# Patient Record
Sex: MALE | Race: WHITE | NOT HISPANIC OR LATINO | Employment: FULL TIME | URBAN - METROPOLITAN AREA
[De-identification: names, ages, dates, MRNs, and addresses within clinical notes are randomized per-mention and may not be internally consistent; named-entity substitution may affect disease eponyms.]

---

## 2023-02-20 PROBLEM — S22.39XA RIB FRACTURE: Status: ACTIVE | Noted: 2023-02-20

## 2023-02-20 PROBLEM — H53.2 DOUBLE VISION: Status: ACTIVE | Noted: 2023-02-20

## 2023-02-20 PROBLEM — R39.15 URINARY URGENCY: Status: ACTIVE | Noted: 2023-02-20

## 2023-02-20 PROBLEM — R68.89 FLU-LIKE SYMPTOMS: Status: ACTIVE | Noted: 2023-02-20

## 2023-02-20 PROBLEM — R05.9 COUGH: Status: ACTIVE | Noted: 2023-02-20

## 2023-02-20 PROBLEM — S29.8XXA BLUNT INJURY TO CHEST: Status: ACTIVE | Noted: 2023-02-20

## 2023-02-20 PROBLEM — G47.00 INSOMNIA: Status: ACTIVE | Noted: 2023-02-20

## 2023-02-20 PROBLEM — F41.8 ANXIETY ASSOCIATED WITH DEPRESSION: Status: ACTIVE | Noted: 2023-02-20

## 2023-02-20 PROBLEM — R19.7 VOMITING AND DIARRHEA: Status: ACTIVE | Noted: 2023-02-20

## 2023-02-20 PROBLEM — R22.1 NECK MASS: Status: ACTIVE | Noted: 2023-02-20
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
## 2023-02-20 PROBLEM — R10.32 LEFT INGUINAL PAIN: Status: ACTIVE | Noted: 2023-02-20

## 2023-02-20 PROBLEM — S22.42XS MULTIPLE FRACTURES OF RIBS, LEFT SIDE, SEQUELA: Status: ACTIVE | Noted: 2023-02-20

## 2023-02-20 PROBLEM — F32.A DEPRESSION: Status: ACTIVE | Noted: 2023-02-20

## 2023-02-20 PROBLEM — S06.0XAA CONCUSSION: Status: ACTIVE | Noted: 2023-02-20

## 2023-02-20 PROBLEM — R11.10 VOMITING AND DIARRHEA: Status: ACTIVE | Noted: 2023-02-20

## 2023-02-20 PROBLEM — K21.9 GERD (GASTROESOPHAGEAL REFLUX DISEASE): Status: ACTIVE | Noted: 2023-02-20

## 2023-02-20 PROBLEM — K46.9 HERNIA OF ABDOMINAL CAVITY: Status: ACTIVE | Noted: 2023-02-20

## 2023-02-20 PROBLEM — L03.90 CELLULITIS: Status: ACTIVE | Noted: 2023-02-20

## 2023-02-20 PROBLEM — S09.8XXA BLUNT HEAD TRAUMA: Status: ACTIVE | Noted: 2023-02-20

## 2023-02-20 PROBLEM — H49.22 SIXTH NERVE PALSY OF LEFT EYE: Status: ACTIVE | Noted: 2023-02-20

## 2023-02-20 PROBLEM — S20.312A ABRASION OF LEFT CHEST WALL: Status: ACTIVE | Noted: 2023-02-20

## 2023-02-20 PROBLEM — R50.9 FEVER AND CHILLS: Status: ACTIVE | Noted: 2023-02-20

## 2023-02-20 RX ORDER — OMEPRAZOLE 40 MG/1
1 CAPSULE, DELAYED RELEASE ORAL DAILY
COMMUNITY
Start: 2019-12-05

## 2023-02-20 RX ORDER — ESCITALOPRAM OXALATE 20 MG/1
1 TABLET ORAL DAILY
COMMUNITY
Start: 2019-11-11 | End: 2023-12-12 | Stop reason: ALTCHOICE

## 2023-03-20 ENCOUNTER — APPOINTMENT (OUTPATIENT)
Dept: PRIMARY CARE | Facility: CLINIC | Age: 33
End: 2023-03-20
Payer: MEDICAID

## 2023-11-23 ENCOUNTER — APPOINTMENT (OUTPATIENT)
Dept: RADIOLOGY | Facility: HOSPITAL | Age: 33
End: 2023-11-23
Payer: MEDICAID

## 2023-11-23 ENCOUNTER — HOSPITAL ENCOUNTER (EMERGENCY)
Facility: HOSPITAL | Age: 33
Discharge: HOME | End: 2023-11-23
Payer: MEDICAID

## 2023-11-23 VITALS
OXYGEN SATURATION: 99 % | WEIGHT: 205 LBS | BODY MASS INDEX: 27.77 KG/M2 | HEART RATE: 77 BPM | DIASTOLIC BLOOD PRESSURE: 83 MMHG | RESPIRATION RATE: 18 BRPM | SYSTOLIC BLOOD PRESSURE: 142 MMHG | HEIGHT: 72 IN | TEMPERATURE: 97.3 F

## 2023-11-23 DIAGNOSIS — R10.13 ABDOMINAL PAIN, EPIGASTRIC: Primary | ICD-10-CM

## 2023-11-23 LAB
ALBUMIN SERPL BCP-MCNC: 4.4 G/DL (ref 3.4–5)
ALP SERPL-CCNC: 80 U/L (ref 33–120)
ALT SERPL W P-5'-P-CCNC: 30 U/L (ref 10–52)
ANION GAP SERPL CALC-SCNC: 14 MMOL/L (ref 10–20)
AST SERPL W P-5'-P-CCNC: 25 U/L (ref 9–39)
BASOPHILS # BLD AUTO: 0.05 X10*3/UL (ref 0–0.1)
BASOPHILS NFR BLD AUTO: 0.6 %
BILIRUB DIRECT SERPL-MCNC: 0.2 MG/DL (ref 0–0.3)
BILIRUB SERPL-MCNC: 0.9 MG/DL (ref 0–1.2)
BUN SERPL-MCNC: 6 MG/DL (ref 6–23)
CALCIUM SERPL-MCNC: 9.1 MG/DL (ref 8.6–10.3)
CARDIAC TROPONIN I PNL SERPL HS: <3 NG/L (ref 0–20)
CHLORIDE SERPL-SCNC: 104 MMOL/L (ref 98–107)
CO2 SERPL-SCNC: 25 MMOL/L (ref 21–32)
CREAT SERPL-MCNC: 0.96 MG/DL (ref 0.5–1.3)
EOSINOPHIL # BLD AUTO: 0.11 X10*3/UL (ref 0–0.7)
EOSINOPHIL NFR BLD AUTO: 1.4 %
ERYTHROCYTE [DISTWIDTH] IN BLOOD BY AUTOMATED COUNT: 14.5 % (ref 11.5–14.5)
GFR SERPL CREATININE-BSD FRML MDRD: >90 ML/MIN/1.73M*2
GLUCOSE SERPL-MCNC: 90 MG/DL (ref 74–99)
HCT VFR BLD AUTO: 50 % (ref 41–52)
HGB BLD-MCNC: 17.1 G/DL (ref 13.5–17.5)
IMM GRANULOCYTES # BLD AUTO: 0.03 X10*3/UL (ref 0–0.7)
IMM GRANULOCYTES NFR BLD AUTO: 0.4 % (ref 0–0.9)
LACTATE SERPL-SCNC: 1.7 MMOL/L (ref 0.4–2)
LIPASE SERPL-CCNC: 17 U/L (ref 9–82)
LYMPHOCYTES # BLD AUTO: 2.29 X10*3/UL (ref 1.2–4.8)
LYMPHOCYTES NFR BLD AUTO: 28.1 %
MCH RBC QN AUTO: 33.3 PG (ref 26–34)
MCHC RBC AUTO-ENTMCNC: 34.2 G/DL (ref 32–36)
MCV RBC AUTO: 97 FL (ref 80–100)
MONOCYTES # BLD AUTO: 0.59 X10*3/UL (ref 0.1–1)
MONOCYTES NFR BLD AUTO: 7.2 %
NEUTROPHILS # BLD AUTO: 5.07 X10*3/UL (ref 1.2–7.7)
NEUTROPHILS NFR BLD AUTO: 62.3 %
NRBC BLD-RTO: 0 /100 WBCS (ref 0–0)
PLATELET # BLD AUTO: 272 X10*3/UL (ref 150–450)
POTASSIUM SERPL-SCNC: 4.2 MMOL/L (ref 3.5–5.3)
PROT SERPL-MCNC: 7.3 G/DL (ref 6.4–8.2)
RBC # BLD AUTO: 5.14 X10*6/UL (ref 4.5–5.9)
SODIUM SERPL-SCNC: 139 MMOL/L (ref 136–145)
WBC # BLD AUTO: 8.1 X10*3/UL (ref 4.4–11.3)

## 2023-11-23 PROCEDURE — 76705 ECHO EXAM OF ABDOMEN: CPT

## 2023-11-23 PROCEDURE — 83605 ASSAY OF LACTIC ACID: CPT | Performed by: NURSE PRACTITIONER

## 2023-11-23 PROCEDURE — 84484 ASSAY OF TROPONIN QUANT: CPT | Performed by: NURSE PRACTITIONER

## 2023-11-23 PROCEDURE — C9113 INJ PANTOPRAZOLE SODIUM, VIA: HCPCS | Performed by: NURSE PRACTITIONER

## 2023-11-23 PROCEDURE — 74177 CT ABD & PELVIS W/CONTRAST: CPT

## 2023-11-23 PROCEDURE — 2500000004 HC RX 250 GENERAL PHARMACY W/ HCPCS (ALT 636 FOR OP/ED): Performed by: NURSE PRACTITIONER

## 2023-11-23 PROCEDURE — 2550000001 HC RX 255 CONTRASTS: Performed by: NURSE PRACTITIONER

## 2023-11-23 PROCEDURE — 82248 BILIRUBIN DIRECT: CPT | Performed by: NURSE PRACTITIONER

## 2023-11-23 PROCEDURE — 83690 ASSAY OF LIPASE: CPT | Performed by: NURSE PRACTITIONER

## 2023-11-23 PROCEDURE — 99285 EMERGENCY DEPT VISIT HI MDM: CPT | Mod: 25

## 2023-11-23 PROCEDURE — 85025 COMPLETE CBC W/AUTO DIFF WBC: CPT | Performed by: NURSE PRACTITIONER

## 2023-11-23 PROCEDURE — 96372 THER/PROPH/DIAG INJ SC/IM: CPT | Mod: 59

## 2023-11-23 PROCEDURE — 74177 CT ABD & PELVIS W/CONTRAST: CPT | Performed by: RADIOLOGY

## 2023-11-23 PROCEDURE — 96374 THER/PROPH/DIAG INJ IV PUSH: CPT | Mod: 59

## 2023-11-23 PROCEDURE — 80053 COMPREHEN METABOLIC PANEL: CPT | Performed by: NURSE PRACTITIONER

## 2023-11-23 PROCEDURE — 76705 ECHO EXAM OF ABDOMEN: CPT | Performed by: STUDENT IN AN ORGANIZED HEALTH CARE EDUCATION/TRAINING PROGRAM

## 2023-11-23 PROCEDURE — 36415 COLL VENOUS BLD VENIPUNCTURE: CPT | Performed by: NURSE PRACTITIONER

## 2023-11-23 PROCEDURE — 2500000001 HC RX 250 WO HCPCS SELF ADMINISTERED DRUGS (ALT 637 FOR MEDICARE OP): Performed by: NURSE PRACTITIONER

## 2023-11-23 PROCEDURE — 96375 TX/PRO/DX INJ NEW DRUG ADDON: CPT

## 2023-11-23 RX ORDER — KETOROLAC TROMETHAMINE 30 MG/ML
15 INJECTION, SOLUTION INTRAMUSCULAR; INTRAVENOUS ONCE
Status: COMPLETED | OUTPATIENT
Start: 2023-11-23 | End: 2023-11-23

## 2023-11-23 RX ORDER — FAMOTIDINE 20 MG/1
20 TABLET, FILM COATED ORAL 2 TIMES DAILY
Qty: 30 TABLET | Refills: 0 | Status: SHIPPED | OUTPATIENT
Start: 2023-11-23 | End: 2024-02-27

## 2023-11-23 RX ORDER — DICYCLOMINE HYDROCHLORIDE 10 MG/ML
20 INJECTION INTRAMUSCULAR ONCE
Status: COMPLETED | OUTPATIENT
Start: 2023-11-23 | End: 2023-11-23

## 2023-11-23 RX ORDER — ALUMINUM HYDROXIDE, MAGNESIUM HYDROXIDE, AND SIMETHICONE 1200; 120; 1200 MG/30ML; MG/30ML; MG/30ML
30 SUSPENSION ORAL ONCE
Status: COMPLETED | OUTPATIENT
Start: 2023-11-23 | End: 2023-11-23

## 2023-11-23 RX ORDER — LIDOCAINE HYDROCHLORIDE 20 MG/ML
15 SOLUTION OROPHARYNGEAL ONCE
Status: COMPLETED | OUTPATIENT
Start: 2023-11-23 | End: 2023-11-23

## 2023-11-23 RX ORDER — PANTOPRAZOLE SODIUM 40 MG/10ML
40 INJECTION, POWDER, LYOPHILIZED, FOR SOLUTION INTRAVENOUS ONCE
Status: COMPLETED | OUTPATIENT
Start: 2023-11-23 | End: 2023-11-23

## 2023-11-23 RX ADMIN — LIDOCAINE HYDROCHLORIDE 15 ML: 20 SOLUTION ORAL at 15:23

## 2023-11-23 RX ADMIN — ALUMINUM HYDROXIDE, MAGNESIUM HYDROXIDE, AND DIMETHICONE 30 ML: 200; 20; 200 SUSPENSION ORAL at 15:23

## 2023-11-23 RX ADMIN — PANTOPRAZOLE SODIUM 40 MG: 40 INJECTION, POWDER, FOR SOLUTION INTRAVENOUS at 15:23

## 2023-11-23 RX ADMIN — IOHEXOL 75 ML: 350 INJECTION, SOLUTION INTRAVENOUS at 16:35

## 2023-11-23 RX ADMIN — KETOROLAC TROMETHAMINE 15 MG: 30 INJECTION, SOLUTION INTRAMUSCULAR; INTRAVENOUS at 16:20

## 2023-11-23 RX ADMIN — DICYCLOMINE HYDROCHLORIDE 20 MG: 10 INJECTION, SOLUTION INTRAMUSCULAR at 17:13

## 2023-11-23 ASSESSMENT — PAIN SCALES - GENERAL
PAINLEVEL_OUTOF10: 8
PAINLEVEL_OUTOF10: 5 - MODERATE PAIN
PAINLEVEL_OUTOF10: 10 - WORST POSSIBLE PAIN

## 2023-11-23 ASSESSMENT — LIFESTYLE VARIABLES
EVER FELT BAD OR GUILTY ABOUT YOUR DRINKING: NO
HAVE PEOPLE ANNOYED YOU BY CRITICIZING YOUR DRINKING: NO
EVER HAD A DRINK FIRST THING IN THE MORNING TO STEADY YOUR NERVES TO GET RID OF A HANGOVER: NO
REASON UNABLE TO ASSESS: NO
HAVE YOU EVER FELT YOU SHOULD CUT DOWN ON YOUR DRINKING: NO

## 2023-11-23 ASSESSMENT — PAIN - FUNCTIONAL ASSESSMENT: PAIN_FUNCTIONAL_ASSESSMENT: 0-10

## 2023-11-23 ASSESSMENT — PAIN DESCRIPTION - LOCATION: LOCATION: ABDOMEN

## 2023-11-23 NOTE — ED PROVIDER NOTES
HPI   Chief Complaint   Patient presents with    Abdominal Pain       33-year-old male with no significant past medical history presents to the emergency department today complaining of abdominal pain.  Patient states that he has been having upper abdominal discomfort for the past 3 days.  Is been consistent in nature.  Is keeping him up at night due to the pain.  He does have a decreased appetite and eating does worsen his discomfort.  He does have some nausea and does vomit to the point where it is painful.  No fever or chills.  States that he did have this a few months ago and was supposed to follow-up with a gastroenterologist but was unable to do so.  He did take some ibuprofen at home as well as omeprazole in the past 2 days.  Denies any urinary symptoms, fever, chills, chest pain or shortness of breath.  No dizziness, headache or syncope.                          E[place] Coma Scale Score: 15                  Patient History   Past Medical History:   Diagnosis Date    Calculus of kidney 07/20/2021    Kidney stone on right side    Personal history of other diseases of the musculoskeletal system and connective tissue     History of lateral epicondylitis of right elbow     Past Surgical History:   Procedure Laterality Date    OTHER SURGICAL HISTORY  11/11/2019    Dental surgery     Family History   Problem Relation Name Age of Onset    Anxiety disorder Mother      Hypertension Mother      Coronary artery disease Father       Social History     Tobacco Use    Smoking status: Not on file    Smokeless tobacco: Not on file   Substance Use Topics    Alcohol use: Not on file    Drug use: Not on file       Physical Exam   ED Triage Vitals [11/23/23 1445]   Temp Heart Rate Resp BP   36.3 °C (97.3 °F) 93 18 --      SpO2 Temp Source Heart Rate Source Patient Position   99 % Temporal Monitor Sitting      BP Location FiO2 (%)     Left arm --       Physical Exam  Vitals and nursing note reviewed.   Constitutional:        General: He is not in acute distress.     Appearance: Normal appearance. He is not toxic-appearing.   HENT:      Right Ear: Tympanic membrane normal.      Left Ear: Tympanic membrane normal.      Mouth/Throat:      Mouth: Mucous membranes are moist.      Pharynx: Oropharynx is clear.   Eyes:      Extraocular Movements: Extraocular movements intact.      Pupils: Pupils are equal, round, and reactive to light.   Cardiovascular:      Rate and Rhythm: Normal rate and regular rhythm.      Pulses: Normal pulses.      Heart sounds: Normal heart sounds.   Pulmonary:      Effort: Pulmonary effort is normal.      Breath sounds: Normal breath sounds.   Abdominal:      General: Abdomen is flat. Bowel sounds are normal.      Palpations: Abdomen is soft.      Tenderness: There is abdominal tenderness in the right upper quadrant, epigastric area and left upper quadrant. There is no right CVA tenderness, left CVA tenderness, guarding or rebound. Negative signs include Woo's sign.   Musculoskeletal:         General: Normal range of motion.      Cervical back: Normal range of motion and neck supple.   Skin:     General: Skin is warm and dry.      Capillary Refill: Capillary refill takes less than 2 seconds.   Neurological:      General: No focal deficit present.      Mental Status: He is alert and oriented to person, place, and time.   Psychiatric:         Mood and Affect: Mood normal.         Behavior: Behavior normal.         Judgment: Judgment normal.         Labs Reviewed   BASIC METABOLIC PANEL - Normal       Result Value    Glucose 90      Sodium 139      Potassium 4.2      Chloride 104      Bicarbonate 25      Anion Gap 14      Urea Nitrogen 6      Creatinine 0.96      eGFR >90      Calcium 9.1     HEPATIC FUNCTION PANEL - Normal    Albumin 4.4      Bilirubin, Total 0.9      Bilirubin, Direct 0.2      Alkaline Phosphatase 80      ALT 30      AST 25      Total Protein 7.3     LIPASE - Normal    Lipase 17      Narrative:      Venipuncture immediately after or during the administration of Metamizole may lead to falsely low results. Testing should be performed immediately prior to Metamizole dosing.   LACTATE - Normal    Lactate 1.7      Narrative:     Venipuncture immediately after or during the administration of Metamizole may lead to falsely low results. Testing should be performed immediately  prior to Metamizole dosing.   TROPONIN I, HIGH SENSITIVITY - Normal    Troponin I, High Sensitivity <3      Narrative:     Less than 99th percentile of normal range cutoff-  Female and children under 18 years old <14 ng/L; Male <21 ng/L: Negative  Repeat testing should be performed if clinically indicated.     Female and children under 18 years old 14-50 ng/L; Male 21-50 ng/L:  Consistent with possible cardiac damage and possible increased clinical   risk. Serial measurements may help to assess extent of myocardial damage.     >50 ng/L: Consistent with cardiac damage, increased clinical risk and  myocardial infarction. Serial measurements may help assess extent of   myocardial damage.      NOTE: Children less than 1 year old may have higher baseline troponin   levels and results should be interpreted in conjunction with the overall   clinical context.     NOTE: Troponin I testing is performed using a different   testing methodology at [facility] than at other   [facility]. Direct result comparisons should only   be made within the same method.   CBC WITH AUTO DIFFERENTIAL    WBC 8.1      nRBC 0.0      RBC 5.14      Hemoglobin 17.1      Hematocrit 50.0      MCV 97      MCH 33.3      MCHC 34.2      RDW 14.5      Platelets 272      Neutrophils % 62.3      Immature Granulocytes %, Automated 0.4      Lymphocytes % 28.1      Monocytes % 7.2      Eosinophils % 1.4      Basophils % 0.6      Neutrophils Absolute 5.07      Immature Granulocytes Absolute, Automated 0.03      Lymphocytes Absolute 2.29      Monocytes Absolute 0.59       Eosinophils Absolute 0.11      Basophils Absolute 0.05       Pain Management Panel          Latest Ref Rng & Units 7/11/2021   Pain Management Panel   Amphetamine Screen, Urine NEGATIVE PRESUMPTIVE NEGATIVE    Barbiturate Screen, Urine NEGATIVE PRESUMPTIVE NEGATIVE    Fentanyl Screen, Urine NEGATIVE PRESUMPTIVE NEGATIVE    Methadone Screen, Urine NEGATIVE PRESUMPTIVE NEGATIVE      CT abdomen pelvis w IV contrast   Final Result   Diffuse fatty metamorphosis of the liver.        2.5 x 2.5 cm geographic area of relative hypodensity in the left   hepatic lobe. This appearance is similar to the previous study and   could reflect an area of focal fat. Space-occupying lesion is less   likely. Continued surveillance is recommended.        Mild colonic diverticulosis.        Small fat containing umbilical hernia.        The remainder of the abdomen and pelvis is unremarkable.                  Signed by: Hamzah Verdugo 11/23/2023 4:46 PM   Dictation workstation:   [id number]      US gallbladder   Final Result   1. Gallbladder sludge with no acute changes.   2. Hepatomegaly with diffuse steatosis. Advise correlation with liver   function tests.        MACRO:   None        Signed by: Jose Mcallister 11/23/2023 3:52 PM   Dictation workstation:   [id number]          ED Course & MDM   ED Course as of 11/23/23 1708   Thu Nov 23, 2023   1640 EKG shows sinus rhythm at a rate of 65  QRS 99 QTc 440 no ST elevation or axis deviation [RB]      ED Course User Index  [RB] EDUARDO Gomez-CNP         Diagnoses as of 11/23/23 1708   Abdominal pain, epigastric       Medical Decision Making  On initial evaluation patient is well-appearing in the emergency department at this time.  He is having mid epigastric right and left upper quadrant tenderness.  There is no rebound or guarding noted at this time.  Pain does worsen with eating.  Ultrasound and lab work were ordered he was initially given a p.o. cocktail as well as  Protonix IV.  Pain did improve from a 10 out of 10 to an 8 out of 10 was then given Toradol with improvement to a 6 out of 10.  BMP hepatic function lipase troponin lactic and CBC are all within normal limits.  Ultrasound shows some gallbladder sludge with no acute changes and hepatomegaly.  CT shows a diffuse fatty metamorphosis of the liver does have a hypodensity in the left hepatic lobe which is similar to previous continue surveillance is recommended.  I discussed results in depth with both patient and girlfriend who are at bedside.  States he feels comfortable going home.  Repeat abdominal exam is within normal limits.  Educated them on strict return precautions and outpatient follow-up.  To give them GI follow-up.  He has no further questions or concerns at this time and patient will be discharged home in improved condition.        Procedure  Procedures      Differential Diagnoses include cholecystitis/cholelithiasis, GERD, hiatal hernia, pancreatitis, ACS  This is not an exhaustive list of all the diagnosis and therapeutics are considered during the patient's evaluation for an emergency medical condition.    I discussed the differential, results and discharge plan with the patient and/or family/friend/caregiver if present.  I emphasized the importance of follow-up with the physician I referred them to in the timeframe recommended.  I explained reasons for the patient to return to the Emergency Department. Additional verbal discharge instructions were also given and discussed with the patient to supplement those generated by the EMR. We also discussed medications that were prescribed (if any) including common side effects and interactions. The patient was advised to abstain from driving, operating heavy machinery or making significant decisions while taking medications such as opiates and muscle relaxers that may impair this. All questions were addressed.  They understand return precautions and discharge  instructions. The patient and/or family/friend/caregiver expressed understanding.           Veda Chen, APRN-CNP  11/23/23 6817

## 2023-12-12 ENCOUNTER — OFFICE VISIT (OUTPATIENT)
Dept: GASTROENTEROLOGY | Facility: CLINIC | Age: 33
End: 2023-12-12
Payer: MEDICAID

## 2023-12-12 VITALS
HEART RATE: 94 BPM | BODY MASS INDEX: 32.2 KG/M2 | DIASTOLIC BLOOD PRESSURE: 90 MMHG | SYSTOLIC BLOOD PRESSURE: 130 MMHG | WEIGHT: 243 LBS | OXYGEN SATURATION: 96 % | HEIGHT: 73 IN

## 2023-12-12 DIAGNOSIS — R10.11 RIGHT UPPER QUADRANT ABDOMINAL PAIN: Primary | ICD-10-CM

## 2023-12-12 PROCEDURE — 99213 OFFICE O/P EST LOW 20 MIN: CPT | Performed by: INTERNAL MEDICINE

## 2023-12-12 NOTE — PROGRESS NOTES
Subjective   Patient ID: Christofer Solis is a 33 y.o. male who presents for No chief complaint on file..  HPI  33-year-old man presented to the office today with complaints of recent epigastric and right upper quadrant pain associated with nausea and anorexia.  He had been taking 3 to 4 tablets ibuprofen daily for low back pains and upon developing epigastric pain he began to take more ibuprofen to up to 3 tablets daily supposedly to relieve his epigastric pain.  His recent emergency room evaluation was notable for the finding of fatty liver on imaging and also gallstone disease.  He was sent home on famotidine 20 mg twice daily which appears to have helped his pain somewhat.  He denied hematemesis or melena stool.    Review of Systems  Constitutional: no fever, no chills, fatigue,  not feeling tired and no recent weight loss. No reports of dizziness.  SKIN: No skin rash or lesions  EYE: No pain photophobia, diplopia or blurred vision  EAR: No discharge, pain or hearing loss  NOSE: No congestion, epistaxis or obstruction  RESPIRATORY: No cough , dyspnea or  chest pain   CV: No chest pain, lower extremity swelling or palpitations  GE: No abdominal pain, nausea, vomiting, dysphagia or odynophagia. Denied constipation , diarrhea  : No dysuria or hematuria, urinary incontinence or urine frequency  NEURO: Denied weakness, confusion, dizziness, or numbness   PSYCH : Denies anxiety, hallucinations or insomnia  HEME/ LYMPH : Denied bleeding , bruising or enlarged nodes  ENDOCRINE: No change in weight, polydipsia, or abnormal bleeding  .       Objective   Physical Exam  Head and neck examinations were  unremarkable. There was no temporal wasting. No jaundice noted. No thyromegaly.  No carotid bruits.  There is no peripheral lymphadenopathy.  Lungs were clear to auscultation. There when no rales, rhonchi or wheezes.  Cardiac examination revealed normal heart sounds. Rhythm was sinus . There were no murmurs.  Abdomen was  full and soft. There was no organomegaly. Bowel sounds were normo- active. There was no ascites. There mild epigastric tenderness on palpation.  Rectal examination was not done.  Extremities: No edema or joint swelling.  Neurological examination: Patient was alert, oriented in person place, and time. There when no focal neurological signs. Cranial nerves were grossly intact. No evidence of encephalopathy. There was no asterixis. The  plantar response was flexor.      Assessment/Plan     Epigastric and right upper quadrant pain in a 33-year-old male who at some point was taking up to 3-4 ibuprofen tablets daily.  He also reported nausea and poor appetite.  He has improved symptomatically with cessation of ibuprofen use and famotidine treatment.    Plan:  Continue current treatment.  Upper GI endoscopy.         Gera Ash MD 12/12/23 3:47 PM

## 2023-12-13 NOTE — PATIENT INSTRUCTIONS
Right  upper quadrant abdominaland epigastric pain in a 33-year-old man who admitted to regular and heavy use of NSAIDs.  Since his recent emergency department evaluation he has stopped the NSAIDs as he is currently taking famotidine.  He has noticed some relief.  He had epigastric tenderness on examination today.  Plan:  I have scheduled him for an upper GI endoscopy examination.

## 2023-12-13 NOTE — PROGRESS NOTES
Subjective   Patient ID: Christofer Solis is a 33 y.o. male who presents for ER follow up 11/23/23 (Upper Abdominal pain , was having nausea / vomiting that has been a little better/Was given meds at ER that - nausea, pepcid, bently for pain /No previous colon / egd ).  HPI  Patient presented for evaluation with complaints of right upper quadrant and abdominal pain associated with nausea and anorexia.  He admitted to regular use of ibuprofen recently.  By his account some days he is taking up to 3 to 4 tablets of ibuprofen per day for relief of low back pain.  At the time of his recent emergency room evaluation, a CT scan of the abdomen and pelvis reported gallstones and evidence of fatty infiltration of the liver.    Review of Systems  Constitutional: no fever, no chills, fatigue,  not feeling tired and no recent weight loss. No reports of dizziness.  SKIN: No skin rash or lesions  EYE: No pain photophobia, diplopia or blurred vision  EAR: No discharge, pain or hearing loss  NOSE: No congestion, epistaxis or obstruction  RESPIRATORY: No cough , dyspnea or  chest pain   CV: No chest pain, lower extremity swelling or palpitations  GE: He has abdominal pain, and nausea, without vomiting. He denied dysphagia or odynophagia. Denied constipation , diarrhea  : No dysuria or hematuria, urinary incontinence or urine frequency  NEURO: Denied weakness, confusion, dizziness, or numbness   PSYCH : Denies anxiety, hallucinations or insomnia  HEME/ LYMPH : Denied bleeding , bruising or enlarged nodes  ENDOCRINE: No change in weight, polydipsia, or abnormal bleeding  .       Objective   Physical Exam  Head and neck examinations were  unremarkable. There was no temporal wasting. No jaundice noted. No thyromegaly.  No carotid bruits.  There is no peripheral lymphadenopathy.  Lungs were clear to auscultation. There when no rales, rhonchi or wheezes.  Cardiac examination revealed normal heart sounds. Rhythm was sinus . There were  no murmurs.  Abdomen was full and soft. There was no organomegaly. Bowel sounds were normo- active. There was no ascites. The abdomen was tender over the epigastrium.  There was no rebound.  Rectal examination was not done.  Extremities: No edema or joint swelling.  Neurological examination: Patient was alert, oriented in person place, and time. There when no focal neurological signs. Cranial nerves were grossly intact. No evidence of encephalopathy. There was no asterixis. The  plantar response was flexor.      Assessment/Plan     Right  upper quadrant abdominaland epigastric pain in a 33-year-old man who admitted to regular and heavy use of NSAIDs.  Since his recent emergency department evaluation he has stopped the NSAIDs as he is currently taking famotidine.  He has noticed some relief.  He had epigastric tenderness on examination today.  Plan:  I have scheduled him for an upper GI endoscopy examination.         Gera Ash MD 12/13/23 4:05 PM

## 2024-01-02 ENCOUNTER — APPOINTMENT (OUTPATIENT)
Dept: GASTROENTEROLOGY | Facility: HOSPITAL | Age: 34
End: 2024-01-02
Payer: MEDICAID

## 2024-01-29 ENCOUNTER — ANESTHESIA EVENT (OUTPATIENT)
Dept: GASTROENTEROLOGY | Facility: HOSPITAL | Age: 34
End: 2024-01-29
Payer: MEDICAID

## 2024-01-30 ENCOUNTER — APPOINTMENT (OUTPATIENT)
Dept: GASTROENTEROLOGY | Facility: HOSPITAL | Age: 34
End: 2024-01-30
Payer: MEDICAID

## 2024-01-30 ENCOUNTER — ANESTHESIA (OUTPATIENT)
Dept: GASTROENTEROLOGY | Facility: HOSPITAL | Age: 34
End: 2024-01-30
Payer: MEDICAID

## 2024-02-27 ENCOUNTER — HOSPITAL ENCOUNTER (OUTPATIENT)
Dept: GASTROENTEROLOGY | Facility: HOSPITAL | Age: 34
Discharge: HOME | End: 2024-02-27
Payer: MEDICAID

## 2024-02-27 VITALS
BODY MASS INDEX: 32.91 KG/M2 | SYSTOLIC BLOOD PRESSURE: 127 MMHG | OXYGEN SATURATION: 96 % | TEMPERATURE: 97.5 F | DIASTOLIC BLOOD PRESSURE: 97 MMHG | WEIGHT: 243 LBS | HEART RATE: 86 BPM | RESPIRATION RATE: 16 BRPM | HEIGHT: 72 IN

## 2024-02-27 DIAGNOSIS — R10.11 RIGHT UPPER QUADRANT ABDOMINAL PAIN: ICD-10-CM

## 2024-02-27 PROCEDURE — 88305 TISSUE EXAM BY PATHOLOGIST: CPT | Performed by: PATHOLOGY

## 2024-02-27 PROCEDURE — 43239 EGD BIOPSY SINGLE/MULTIPLE: CPT | Performed by: INTERNAL MEDICINE

## 2024-02-27 PROCEDURE — 7100000009 HC PHASE TWO TIME - INITIAL BASE CHARGE

## 2024-02-27 PROCEDURE — 2500000004 HC RX 250 GENERAL PHARMACY W/ HCPCS (ALT 636 FOR OP/ED): Performed by: INTERNAL MEDICINE

## 2024-02-27 PROCEDURE — 3700000001 HC GENERAL ANESTHESIA TIME - INITIAL BASE CHARGE

## 2024-02-27 PROCEDURE — 3700000002 HC GENERAL ANESTHESIA TIME - EACH INCREMENTAL 1 MINUTE

## 2024-02-27 PROCEDURE — 2500000005 HC RX 250 GENERAL PHARMACY W/O HCPCS: Performed by: NURSE ANESTHETIST, CERTIFIED REGISTERED

## 2024-02-27 PROCEDURE — 2500000004 HC RX 250 GENERAL PHARMACY W/ HCPCS (ALT 636 FOR OP/ED): Performed by: NURSE ANESTHETIST, CERTIFIED REGISTERED

## 2024-02-27 PROCEDURE — 0753T DGTZ GLS MCRSCP SLD LEVEL IV: CPT | Mod: TC,PORLAB | Performed by: INTERNAL MEDICINE

## 2024-02-27 PROCEDURE — 7100000010 HC PHASE TWO TIME - EACH INCREMENTAL 1 MINUTE

## 2024-02-27 RX ORDER — LIDOCAINE HYDROCHLORIDE 20 MG/ML
INJECTION, SOLUTION EPIDURAL; INFILTRATION; INTRACAUDAL; PERINEURAL AS NEEDED
Status: DISCONTINUED | OUTPATIENT
Start: 2024-02-27 | End: 2024-02-27

## 2024-02-27 RX ORDER — SODIUM CHLORIDE 9 MG/ML
30 INJECTION, SOLUTION INTRAVENOUS CONTINUOUS
Status: DISCONTINUED | OUTPATIENT
Start: 2024-02-27 | End: 2024-02-28 | Stop reason: HOSPADM

## 2024-02-27 RX ORDER — FENTANYL CITRATE 50 UG/ML
INJECTION, SOLUTION INTRAMUSCULAR; INTRAVENOUS AS NEEDED
Status: DISCONTINUED | OUTPATIENT
Start: 2024-02-27 | End: 2024-02-27

## 2024-02-27 RX ORDER — PROPOFOL 10 MG/ML
INJECTION, EMULSION INTRAVENOUS AS NEEDED
Status: DISCONTINUED | OUTPATIENT
Start: 2024-02-27 | End: 2024-02-27

## 2024-02-27 RX ADMIN — PROPOFOL 50 MG: 10 INJECTION, EMULSION INTRAVENOUS at 12:53

## 2024-02-27 RX ADMIN — FENTANYL CITRATE 50 MCG: 50 INJECTION INTRAMUSCULAR; INTRAVENOUS at 12:52

## 2024-02-27 RX ADMIN — LIDOCAINE HYDROCHLORIDE 40 MG: 20 INJECTION, SOLUTION EPIDURAL; INFILTRATION; INTRACAUDAL; PERINEURAL at 12:52

## 2024-02-27 RX ADMIN — FENTANYL CITRATE 25 MCG: 50 INJECTION INTRAMUSCULAR; INTRAVENOUS at 12:55

## 2024-02-27 RX ADMIN — PROPOFOL 50 MG: 10 INJECTION, EMULSION INTRAVENOUS at 13:00

## 2024-02-27 RX ADMIN — PROPOFOL 100 MG: 10 INJECTION, EMULSION INTRAVENOUS at 12:52

## 2024-02-27 RX ADMIN — SODIUM CHLORIDE: 9 INJECTION, SOLUTION INTRAVENOUS at 12:46

## 2024-02-27 RX ADMIN — PROPOFOL 50 MG: 10 INJECTION, EMULSION INTRAVENOUS at 12:58

## 2024-02-27 RX ADMIN — FENTANYL CITRATE 25 MCG: 50 INJECTION INTRAMUSCULAR; INTRAVENOUS at 12:58

## 2024-02-27 RX ADMIN — PROPOFOL 50 MG: 10 INJECTION, EMULSION INTRAVENOUS at 12:57

## 2024-02-27 RX ADMIN — PROPOFOL 50 MG: 10 INJECTION, EMULSION INTRAVENOUS at 12:54

## 2024-02-27 RX ADMIN — PROPOFOL 50 MG: 10 INJECTION, EMULSION INTRAVENOUS at 12:55

## 2024-02-27 SDOH — HEALTH STABILITY: MENTAL HEALTH: CURRENT SMOKER: 1

## 2024-02-27 ASSESSMENT — PAIN - FUNCTIONAL ASSESSMENT: PAIN_FUNCTIONAL_ASSESSMENT: 0-10

## 2024-02-27 ASSESSMENT — COLUMBIA-SUICIDE SEVERITY RATING SCALE - C-SSRS
2. HAVE YOU ACTUALLY HAD ANY THOUGHTS OF KILLING YOURSELF?: NO
6. HAVE YOU EVER DONE ANYTHING, STARTED TO DO ANYTHING, OR PREPARED TO DO ANYTHING TO END YOUR LIFE?: NO
1. IN THE PAST MONTH, HAVE YOU WISHED YOU WERE DEAD OR WISHED YOU COULD GO TO SLEEP AND NOT WAKE UP?: NO

## 2024-02-27 ASSESSMENT — PAIN SCALES - GENERAL
PAIN_LEVEL: 0
PAINLEVEL_OUTOF10: 0 - NO PAIN

## 2024-02-27 NOTE — H&P
History Of Present Illness  Christofer Solis is a 33 y.o. male presenting for evaluation of right upper quadrant pain associated with nausea.  The patient at the time admitted to taking significant amounts of ibuprofen for relief of bodily pains.  When he developed the right upper quadrant pain he took even more ibuprofen.  Lately he was started on treatment with famotidine 20 mg twice daily which appears to help his abdominal pain..     Past Medical History  He has a past medical history of Abdominal pain, Calculus of kidney (07/20/2021), Nausea & vomiting, and Personal history of other diseases of the musculoskeletal system and connective tissue.    Surgical History  He has a past surgical history that includes Other surgical history (11/11/2019).     Social History  He reports that he has been smoking cigarettes. He has been smoking an average of 1 pack per day. He has never used smokeless tobacco. He reports current alcohol use. He reports that he does not use drugs.    Family History  Family History   Problem Relation Name Age of Onset    Anxiety disorder Mother      Hypertension Mother      Prostate cancer Father          Allergies  Patient has no known allergies.    Review of Systems  Constitutional: no fever, no chills, fatigue,  not feeling tired and no recent weight loss. No reports of dizziness.  SKIN: No skin rash or lesions  EYE: No pain photophobia, diplopia or blurred vision  EAR: No discharge, pain or hearing loss  NOSE: No congestion, epistaxis or obstruction  RESPIRATORY: No cough , dyspnea or  chest pain   CV: No chest pain, lower extremity swelling or palpitations  GE: No abdominal pain, nausea, vomiting, dysphagia or odynophagia. Denied constipation , diarrhea  : No dysuria or hematuria, urinary incontinence or urine frequency  NEURO: Denied weakness, confusion, dizziness, or numbness   PSYCH : Denies anxiety, hallucinations or insomnia  HEME/ LYMPH : Denied bleeding , bruising or enlarged  nodes  ENDOCRINE: No change in weight, polydipsia, or abnormal bleeding  .        Physical Exam  Head and neck examinations were  unremarkable. There was no temporal wasting. No jaundice noted. No thyromegaly.  No carotid bruits.  There is no peripheral lymphadenopathy.  Lungs were clear to auscultation. There when no rales, rhonchi or wheezes.  Cardiac examination revealed normal heart sounds. Rhythm was sinus . There were no murmurs.  Abdomen was full and soft. There was no organomegaly. Bowel sounds were normo- active. There was no ascites. The abdomen was nontender.  Rectal examination was not done.  Extremities: No edema or joint swelling.  Neurological examination: Patient was alert, oriented in person place, and time. There when no focal neurological signs. Cranial nerves were grossly intact. No evidence of encephalopathy. There was no asterixis. The  plantar response was flexor.    Last Recorded Vitals  Blood pressure (!) 143/109, pulse 98, temperature 36.2 °C (97.2 °F), temperature source Temporal, resp. rate 20, height 1.829 m (6'), weight 110 kg (243 lb), SpO2 99 %.    Relevant Results        None     Assessment/Plan  33-year-old man with complaints of right upper quadrant pain.  He admitted to heavy use of ibuprofen at the time.  His pain appears to have been relieved somewhat since treatment with famotidine.  Proceed with upper GI endoscopy to document extent of his lesion or mucosal injury.    Gera Ash MD

## 2024-02-27 NOTE — ANESTHESIA POSTPROCEDURE EVALUATION
Patient: Christofer Solis    Procedure Summary       Date: 02/27/24 Room / Location: [facility]    Anesthesia Start: 1246 Anesthesia Stop: 1307    Procedure: EGD Diagnosis: Right upper quadrant abdominal pain    Scheduled Providers: Gera Ash MD Responsible Provider: DAMON Alberto    Anesthesia Type: MAC ASA Status: 2            Anesthesia Type: MAC    Vitals Value Taken Time   /85 02/27/24 1308   Temp 36.4 °C (97.5 °F) 02/27/24 1308   Pulse 95 02/27/24 1308   Resp 16 02/27/24 1308   SpO2 92 % 02/27/24 1308       Anesthesia Post Evaluation    Patient location during evaluation: bedside  Patient participation: complete - patient participated  Level of consciousness: awake and alert  Pain score: 0  Pain management: adequate  Airway patency: patent  Cardiovascular status: acceptable  Respiratory status: acceptable  Hydration status: acceptable  Postoperative Nausea and Vomiting: none    There were no known notable events for this encounter.

## 2024-02-27 NOTE — ANESTHESIA PREPROCEDURE EVALUATION
Patient: Christofer Solis    Procedure Information       Date/Time: 02/27/24 1230    Scheduled providers: Gera Ash MD    Procedure: EGD    Location: [facility]            Relevant Problems   Anesthesia (within normal limits)      Cardiovascular (within normal limits)      Endocrine (within normal limits)      GI   (+) GERD (gastroesophageal reflux disease)      /Renal (within normal limits)      Neuro/Psych   (+) Anxiety associated with depression   (+) Depression   (+) Sixth nerve palsy of left eye      Pulmonary (within normal limits)      GI/Hepatic (within normal limits)      Hematology (within normal limits)      Eyes, Ears, Nose, and Throat (within normal limits)       Clinical information reviewed:   Tobacco  Allergies  Meds   Med Hx  Surg Hx   Fam Hx  Soc Hx        NPO Detail:  NPO/Void Status  Date of Last Liquid: 02/26/24  Time of Last Liquid: 1100  Date of Last Solid: 02/26/24  Time of Last Solid: 1930         Physical Exam    Airway  Mallampati: II  TM distance: >3 FB  Neck ROM: full     Cardiovascular - normal exam  Rhythm: regular     Dental   (+) upper dentures     Pulmonary - normal exam     Abdominal - normal exam         Anesthesia Plan    History of general anesthesia?: yes  History of complications of general anesthesia?: no    ASA 2     MAC     The patient is a current smoker.  Patient was previously instructed to abstain from smoking on day of procedure.  Patient smoked on day of procedure.    intravenous induction   Anesthetic plan and risks discussed with patient.

## 2024-03-06 LAB
LABORATORY COMMENT REPORT: NORMAL
PATH REPORT.FINAL DX SPEC: NORMAL
PATH REPORT.GROSS SPEC: NORMAL
PATH REPORT.RELEVANT HX SPEC: NORMAL
PATH REPORT.TOTAL CANCER: NORMAL

## 2024-07-02 ENCOUNTER — APPOINTMENT (OUTPATIENT)
Dept: PRIMARY CARE | Facility: CLINIC | Age: 34
End: 2024-07-02
Payer: MEDICAID

## 2024-07-18 ENCOUNTER — APPOINTMENT (OUTPATIENT)
Dept: RADIOLOGY | Facility: HOSPITAL | Age: 34
End: 2024-07-18
Payer: MEDICARE

## 2024-07-18 ENCOUNTER — HOSPITAL ENCOUNTER (EMERGENCY)
Facility: HOSPITAL | Age: 34
Discharge: HOME | End: 2024-07-19
Attending: STUDENT IN AN ORGANIZED HEALTH CARE EDUCATION/TRAINING PROGRAM
Payer: MEDICARE

## 2024-07-18 DIAGNOSIS — S01.512A LACERATION OF TONGUE, INITIAL ENCOUNTER: ICD-10-CM

## 2024-07-18 DIAGNOSIS — T07.XXXA ABRASIONS OF MULTIPLE SITES: ICD-10-CM
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
## 2024-07-18 LAB
ALBUMIN SERPL BCP-MCNC: 3.7 G/DL (ref 3.4–5)
ALP SERPL-CCNC: 101 U/L (ref 33–120)
ALT SERPL W P-5'-P-CCNC: 91 U/L (ref 10–52)
ANION GAP SERPL CALC-SCNC: 12 MMOL/L (ref 10–20)
AST SERPL W P-5'-P-CCNC: 213 U/L (ref 9–39)
BASOPHILS # BLD AUTO: 0.06 X10*3/UL (ref 0–0.1)
BASOPHILS NFR BLD AUTO: 0.7 %
BILIRUB SERPL-MCNC: 0.8 MG/DL (ref 0–1.2)
BUN SERPL-MCNC: 6 MG/DL (ref 6–23)
CALCIUM SERPL-MCNC: 8.6 MG/DL (ref 8.6–10.3)
CHLORIDE SERPL-SCNC: 108 MMOL/L (ref 98–107)
CO2 SERPL-SCNC: 24 MMOL/L (ref 21–32)
CREAT SERPL-MCNC: 0.82 MG/DL (ref 0.5–1.3)
EGFRCR SERPLBLD CKD-EPI 2021: >90 ML/MIN/1.73M*2
EOSINOPHIL # BLD AUTO: 0.22 X10*3/UL (ref 0–0.7)
EOSINOPHIL NFR BLD AUTO: 2.7 %
ERYTHROCYTE [DISTWIDTH] IN BLOOD BY AUTOMATED COUNT: 16.9 % (ref 11.5–14.5)
ETHANOL SERPL-MCNC: 128 MG/DL
GLUCOSE SERPL-MCNC: 109 MG/DL (ref 74–99)
HCT VFR BLD AUTO: 44.5 % (ref 41–52)
HGB BLD-MCNC: 15.5 G/DL (ref 13.5–17.5)
IMM GRANULOCYTES # BLD AUTO: 0.06 X10*3/UL (ref 0–0.7)
IMM GRANULOCYTES NFR BLD AUTO: 0.7 % (ref 0–0.9)
INR PPP: 1 (ref 0.9–1.1)
LACTATE SERPL-SCNC: 2 MMOL/L (ref 0.4–2)
LYMPHOCYTES # BLD AUTO: 3.01 X10*3/UL (ref 1.2–4.8)
LYMPHOCYTES NFR BLD AUTO: 37.5 %
MCH RBC QN AUTO: 35.5 PG (ref 26–34)
MCHC RBC AUTO-ENTMCNC: 34.8 G/DL (ref 32–36)
MCV RBC AUTO: 102 FL (ref 80–100)
MONOCYTES # BLD AUTO: 0.55 X10*3/UL (ref 0.1–1)
MONOCYTES NFR BLD AUTO: 6.8 %
NEUTROPHILS # BLD AUTO: 4.13 X10*3/UL (ref 1.2–7.7)
NEUTROPHILS NFR BLD AUTO: 51.6 %
NRBC BLD-RTO: 0 /100 WBCS (ref 0–0)
PLATELET # BLD AUTO: 288 X10*3/UL (ref 150–450)
POTASSIUM SERPL-SCNC: 3.1 MMOL/L (ref 3.5–5.3)
PROT SERPL-MCNC: 6.6 G/DL (ref 6.4–8.2)
PROTHROMBIN TIME: 10.7 SECONDS (ref 9.8–12.8)
RBC # BLD AUTO: 4.37 X10*6/UL (ref 4.5–5.9)
SODIUM SERPL-SCNC: 141 MMOL/L (ref 136–145)
WBC # BLD AUTO: 8 X10*3/UL (ref 4.4–11.3)

## 2024-07-18 PROCEDURE — 82077 ASSAY SPEC XCP UR&BREATH IA: CPT | Performed by: STUDENT IN AN ORGANIZED HEALTH CARE EDUCATION/TRAINING PROGRAM

## 2024-07-18 PROCEDURE — 72125 CT NECK SPINE W/O DYE: CPT

## 2024-07-18 PROCEDURE — 85610 PROTHROMBIN TIME: CPT | Performed by: STUDENT IN AN ORGANIZED HEALTH CARE EDUCATION/TRAINING PROGRAM

## 2024-07-18 PROCEDURE — 12011 RPR F/E/E/N/L/M 2.5 CM/<: CPT

## 2024-07-18 PROCEDURE — 2500000004 HC RX 250 GENERAL PHARMACY W/ HCPCS (ALT 636 FOR OP/ED)

## 2024-07-18 PROCEDURE — 99285 EMERGENCY DEPT VISIT HI MDM: CPT | Mod: 25

## 2024-07-18 PROCEDURE — 71260 CT THORAX DX C+: CPT | Performed by: RADIOLOGY

## 2024-07-18 PROCEDURE — 72128 CT CHEST SPINE W/O DYE: CPT | Performed by: RADIOLOGY

## 2024-07-18 PROCEDURE — 76377 3D RENDER W/INTRP POSTPROCES: CPT

## 2024-07-18 PROCEDURE — 70486 CT MAXILLOFACIAL W/O DYE: CPT | Performed by: RADIOLOGY

## 2024-07-18 PROCEDURE — 96374 THER/PROPH/DIAG INJ IV PUSH: CPT

## 2024-07-18 PROCEDURE — 74177 CT ABD & PELVIS W/CONTRAST: CPT | Performed by: RADIOLOGY

## 2024-07-18 PROCEDURE — 70450 CT HEAD/BRAIN W/O DYE: CPT | Performed by: RADIOLOGY

## 2024-07-18 PROCEDURE — 70450 CT HEAD/BRAIN W/O DYE: CPT

## 2024-07-18 PROCEDURE — 96361 HYDRATE IV INFUSION ADD-ON: CPT

## 2024-07-18 PROCEDURE — 72131 CT LUMBAR SPINE W/O DYE: CPT | Mod: RSC

## 2024-07-18 PROCEDURE — 85025 COMPLETE CBC W/AUTO DIFF WBC: CPT | Performed by: STUDENT IN AN ORGANIZED HEALTH CARE EDUCATION/TRAINING PROGRAM

## 2024-07-18 PROCEDURE — 2500000004 HC RX 250 GENERAL PHARMACY W/ HCPCS (ALT 636 FOR OP/ED): Performed by: STUDENT IN AN ORGANIZED HEALTH CARE EDUCATION/TRAINING PROGRAM

## 2024-07-18 PROCEDURE — 72125 CT NECK SPINE W/O DYE: CPT | Performed by: RADIOLOGY

## 2024-07-18 PROCEDURE — 99291 CRITICAL CARE FIRST HOUR: CPT | Performed by: STUDENT IN AN ORGANIZED HEALTH CARE EDUCATION/TRAINING PROGRAM

## 2024-07-18 PROCEDURE — 72131 CT LUMBAR SPINE W/O DYE: CPT | Performed by: RADIOLOGY

## 2024-07-18 PROCEDURE — 36415 COLL VENOUS BLD VENIPUNCTURE: CPT | Performed by: STUDENT IN AN ORGANIZED HEALTH CARE EDUCATION/TRAINING PROGRAM

## 2024-07-18 PROCEDURE — 76377 3D RENDER W/INTRP POSTPROCES: CPT | Performed by: RADIOLOGY

## 2024-07-18 PROCEDURE — 86901 BLOOD TYPING SEROLOGIC RH(D): CPT | Performed by: STUDENT IN AN ORGANIZED HEALTH CARE EDUCATION/TRAINING PROGRAM

## 2024-07-18 PROCEDURE — 80053 COMPREHEN METABOLIC PANEL: CPT | Performed by: STUDENT IN AN ORGANIZED HEALTH CARE EDUCATION/TRAINING PROGRAM

## 2024-07-18 PROCEDURE — 72128 CT CHEST SPINE W/O DYE: CPT | Mod: RSC

## 2024-07-18 PROCEDURE — 2550000001 HC RX 255 CONTRASTS: Performed by: STUDENT IN AN ORGANIZED HEALTH CARE EDUCATION/TRAINING PROGRAM

## 2024-07-18 PROCEDURE — 83605 ASSAY OF LACTIC ACID: CPT | Performed by: STUDENT IN AN ORGANIZED HEALTH CARE EDUCATION/TRAINING PROGRAM

## 2024-07-18 PROCEDURE — 71260 CT THORAX DX C+: CPT

## 2024-07-18 PROCEDURE — 70486 CT MAXILLOFACIAL W/O DYE: CPT

## 2024-07-18 ASSESSMENT — PAIN DESCRIPTION - PROGRESSION: CLINICAL_PROGRESSION: NOT CHANGED

## 2024-07-18 ASSESSMENT — PAIN SCALES - GENERAL
PAINLEVEL_OUTOF10: 10 - WORST POSSIBLE PAIN
PAINLEVEL_OUTOF10: 6
PAINLEVEL_OUTOF10: 8

## 2024-07-18 ASSESSMENT — PAIN DESCRIPTION - LOCATION: LOCATION: ABDOMEN

## 2024-07-18 ASSESSMENT — PAIN - FUNCTIONAL ASSESSMENT: PAIN_FUNCTIONAL_ASSESSMENT: 0-10

## 2024-07-18 ASSESSMENT — PAIN DESCRIPTION - FREQUENCY: FREQUENCY: CONSTANT/CONTINUOUS

## 2024-07-18 ASSESSMENT — PAIN DESCRIPTION - ONSET: ONSET: ONGOING

## 2024-07-18 NOTE — Clinical Note
Christofer Solis was seen and treated in our emergency department on 7/18/2024.  He may return to work on 07/22/2024.       If you have any questions or concerns, please don't hesitate to call.      Froy Mcgraw, APRN-CNP

## 2024-07-19 ENCOUNTER — APPOINTMENT (OUTPATIENT)
Dept: RADIOLOGY | Facility: HOSPITAL | Age: 34
End: 2024-07-19
Payer: MEDICARE

## 2024-07-19 VITALS
BODY MASS INDEX: 29.8 KG/M2 | DIASTOLIC BLOOD PRESSURE: 95 MMHG | TEMPERATURE: 98.1 F | HEIGHT: 72 IN | SYSTOLIC BLOOD PRESSURE: 131 MMHG | OXYGEN SATURATION: 98 % | WEIGHT: 220 LBS | RESPIRATION RATE: 16 BRPM | HEART RATE: 74 BPM

## 2024-07-19 LAB
ABO GROUP (TYPE) IN BLOOD: NORMAL
ANTIBODY SCREEN: NORMAL
RH FACTOR (ANTIGEN D): NORMAL

## 2024-07-19 PROCEDURE — 2500000004 HC RX 250 GENERAL PHARMACY W/ HCPCS (ALT 636 FOR OP/ED): Performed by: STUDENT IN AN ORGANIZED HEALTH CARE EDUCATION/TRAINING PROGRAM

## 2024-07-19 PROCEDURE — 96375 TX/PRO/DX INJ NEW DRUG ADDON: CPT

## 2024-07-19 PROCEDURE — 96361 HYDRATE IV INFUSION ADD-ON: CPT

## 2024-07-19 PROCEDURE — 73564 X-RAY EXAM KNEE 4 OR MORE: CPT | Mod: RIGHT SIDE | Performed by: RADIOLOGY

## 2024-07-19 PROCEDURE — 96365 THER/PROPH/DIAG IV INF INIT: CPT

## 2024-07-19 PROCEDURE — 73564 X-RAY EXAM KNEE 4 OR MORE: CPT | Mod: RT

## 2024-07-19 PROCEDURE — 2500000001 HC RX 250 WO HCPCS SELF ADMINISTERED DRUGS (ALT 637 FOR MEDICARE OP): Performed by: STUDENT IN AN ORGANIZED HEALTH CARE EDUCATION/TRAINING PROGRAM

## 2024-07-19 PROCEDURE — 90715 TDAP VACCINE 7 YRS/> IM: CPT | Performed by: STUDENT IN AN ORGANIZED HEALTH CARE EDUCATION/TRAINING PROGRAM

## 2024-07-19 PROCEDURE — 90471 IMMUNIZATION ADMIN: CPT | Performed by: STUDENT IN AN ORGANIZED HEALTH CARE EDUCATION/TRAINING PROGRAM

## 2024-07-19 PROCEDURE — 2500000004 HC RX 250 GENERAL PHARMACY W/ HCPCS (ALT 636 FOR OP/ED): Performed by: EMERGENCY MEDICINE

## 2024-07-19 PROCEDURE — 96366 THER/PROPH/DIAG IV INF ADDON: CPT

## 2024-07-19 RX ORDER — IBUPROFEN 600 MG/1
600 TABLET ORAL EVERY 6 HOURS PRN
Qty: 30 TABLET | Refills: 0 | Status: SHIPPED | OUTPATIENT
Start: 2024-07-19

## 2024-07-19 RX ORDER — CHLORHEXIDINE GLUCONATE ORAL RINSE 1.2 MG/ML
15 SOLUTION DENTAL AS NEEDED
Qty: 120 ML | Refills: 0 | Status: SHIPPED | OUTPATIENT
Start: 2024-07-19

## 2024-07-19 RX ORDER — POTASSIUM CHLORIDE 1.5 G/1.58G
20 POWDER, FOR SOLUTION ORAL ONCE
Status: COMPLETED | OUTPATIENT
Start: 2024-07-19 | End: 2024-07-19

## 2024-07-19 RX ORDER — POTASSIUM CHLORIDE 14.9 MG/ML
20 INJECTION INTRAVENOUS ONCE
Status: COMPLETED | OUTPATIENT
Start: 2024-07-19 | End: 2024-07-19

## 2024-07-19 RX ORDER — CYCLOBENZAPRINE HCL 10 MG
10 TABLET ORAL 3 TIMES DAILY PRN
Qty: 20 TABLET | Refills: 0 | Status: SHIPPED | OUTPATIENT
Start: 2024-07-19

## 2024-07-19 RX ORDER — KETOROLAC TROMETHAMINE 30 MG/ML
15 INJECTION, SOLUTION INTRAMUSCULAR; INTRAVENOUS ONCE
Status: COMPLETED | OUTPATIENT
Start: 2024-07-19 | End: 2024-07-19

## 2024-07-19 RX ORDER — ACETAMINOPHEN 325 MG/1
650 TABLET ORAL EVERY 6 HOURS PRN
Qty: 30 TABLET | Refills: 0 | Status: SHIPPED | OUTPATIENT
Start: 2024-07-19

## 2024-07-19 ASSESSMENT — PAIN SCALES - GENERAL
PAINLEVEL_OUTOF10: 8
PAINLEVEL_OUTOF10: 7
PAINLEVEL_OUTOF10: 4

## 2024-07-19 ASSESSMENT — PAIN DESCRIPTION - PROGRESSION: CLINICAL_PROGRESSION: GRADUALLY IMPROVING

## 2024-07-19 ASSESSMENT — PAIN - FUNCTIONAL ASSESSMENT: PAIN_FUNCTIONAL_ASSESSMENT: 0-10
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
## 2024-07-19 NOTE — ED PROCEDURE NOTE
Procedure  Procedures        Site was draped in a sterile manner. The area was cleansed with Shur-Clens and locally anesthetized with 4mLs of 1% Lidocaine. Upon further exploration, there were no foreign bodies or step-offs noted. After the scabbed over blood was removed, the wound was an avulsion rather than a laceration. Therefore, sutres were not clinically indicated. Gelfoam was applied to the area. Instructed to keep the area clean and dry for the next 24 hours, then wash with soap and water and apply antibiotic ointment daily. Educated on monitoring for signs and symptoms of infection. Educated that there will be a scar.     Right eyebrow avulsion    Lucien Hernandez [facility] [place] CNP     EDUARDO Belle-MOISÉS  07/19/24 0122

## 2024-10-17 ENCOUNTER — APPOINTMENT (OUTPATIENT)
Dept: PRIMARY CARE | Facility: CLINIC | Age: 34
End: 2024-10-17
Payer: MEDICAID